# Patient Record
Sex: FEMALE | Race: BLACK OR AFRICAN AMERICAN | URBAN - METROPOLITAN AREA
[De-identification: names, ages, dates, MRNs, and addresses within clinical notes are randomized per-mention and may not be internally consistent; named-entity substitution may affect disease eponyms.]

---

## 2017-12-26 ENCOUNTER — HOSPITAL ENCOUNTER (EMERGENCY)
Age: 62
Discharge: HOME | End: 2017-12-26
Attending: EMERGENCY MEDICINE
Payer: SUBSIDIZED

## 2017-12-26 ENCOUNTER — APPOINTMENT (OUTPATIENT)
Dept: GENERAL RADIOLOGY | Age: 62
End: 2017-12-26
Attending: EMERGENCY MEDICINE
Payer: SUBSIDIZED

## 2017-12-26 VITALS
WEIGHT: 118 LBS | SYSTOLIC BLOOD PRESSURE: 153 MMHG | BODY MASS INDEX: 21.71 KG/M2 | DIASTOLIC BLOOD PRESSURE: 76 MMHG | HEIGHT: 62 IN | OXYGEN SATURATION: 99 % | HEART RATE: 75 BPM | TEMPERATURE: 97.3 F | RESPIRATION RATE: 22 BRPM

## 2017-12-26 DIAGNOSIS — J44.1 COPD EXACERBATION (HCC): Primary | ICD-10-CM

## 2017-12-26 LAB
ANION GAP SERPL CALC-SCNC: 7 MMOL/L (ref 3–18)
ATRIAL RATE: 70 BPM
BASOPHILS # BLD: 0 K/UL (ref 0–0.06)
BASOPHILS NFR BLD: 0 % (ref 0–2)
BUN SERPL-MCNC: 12 MG/DL (ref 7–18)
BUN/CREAT SERPL: 15 (ref 12–20)
CALCIUM SERPL-MCNC: 9.8 MG/DL (ref 8.5–10.1)
CALCULATED P AXIS, ECG09: 46 DEGREES
CALCULATED R AXIS, ECG10: 27 DEGREES
CALCULATED T AXIS, ECG11: 53 DEGREES
CHLORIDE SERPL-SCNC: 102 MMOL/L (ref 100–108)
CK MB CFR SERPL CALC: 1.1 % (ref 0–4)
CK MB SERPL-MCNC: 1 NG/ML (ref 5–25)
CK SERPL-CCNC: 89 U/L (ref 26–192)
CO2 SERPL-SCNC: 27 MMOL/L (ref 21–32)
CREAT SERPL-MCNC: 0.79 MG/DL (ref 0.6–1.3)
DIAGNOSIS, 93000: NORMAL
DIFFERENTIAL METHOD BLD: ABNORMAL
EOSINOPHIL # BLD: 0.1 K/UL (ref 0–0.4)
EOSINOPHIL NFR BLD: 1 % (ref 0–5)
ERYTHROCYTE [DISTWIDTH] IN BLOOD BY AUTOMATED COUNT: 13.8 % (ref 11.6–14.5)
GLUCOSE SERPL-MCNC: 101 MG/DL (ref 74–99)
HCT VFR BLD AUTO: 38.2 % (ref 35–45)
HGB BLD-MCNC: 12.5 G/DL (ref 12–16)
LYMPHOCYTES # BLD: 1.4 K/UL (ref 0.9–3.6)
LYMPHOCYTES NFR BLD: 20 % (ref 21–52)
MCH RBC QN AUTO: 26.7 PG (ref 24–34)
MCHC RBC AUTO-ENTMCNC: 32.7 G/DL (ref 31–37)
MCV RBC AUTO: 81.6 FL (ref 74–97)
MONOCYTES # BLD: 0.3 K/UL (ref 0.05–1.2)
MONOCYTES NFR BLD: 5 % (ref 3–10)
NEUTS SEG # BLD: 5.1 K/UL (ref 1.8–8)
NEUTS SEG NFR BLD: 74 % (ref 40–73)
P-R INTERVAL, ECG05: 136 MS
PLATELET # BLD AUTO: 211 K/UL (ref 135–420)
PMV BLD AUTO: 10.6 FL (ref 9.2–11.8)
POTASSIUM SERPL-SCNC: 3.5 MMOL/L (ref 3.5–5.5)
Q-T INTERVAL, ECG07: 400 MS
QRS DURATION, ECG06: 98 MS
QTC CALCULATION (BEZET), ECG08: 432 MS
RBC # BLD AUTO: 4.68 M/UL (ref 4.2–5.3)
SODIUM SERPL-SCNC: 136 MMOL/L (ref 136–145)
TROPONIN I SERPL-MCNC: <0.02 NG/ML (ref 0–0.04)
VENTRICULAR RATE, ECG03: 70 BPM
WBC # BLD AUTO: 6.9 K/UL (ref 4.6–13.2)

## 2017-12-26 PROCEDURE — 74011000250 HC RX REV CODE- 250: Performed by: EMERGENCY MEDICINE

## 2017-12-26 PROCEDURE — 80048 BASIC METABOLIC PNL TOTAL CA: CPT | Performed by: EMERGENCY MEDICINE

## 2017-12-26 PROCEDURE — 82550 ASSAY OF CK (CPK): CPT | Performed by: EMERGENCY MEDICINE

## 2017-12-26 PROCEDURE — 71010 XR CHEST PORT: CPT

## 2017-12-26 PROCEDURE — 85025 COMPLETE CBC W/AUTO DIFF WBC: CPT | Performed by: EMERGENCY MEDICINE

## 2017-12-26 PROCEDURE — 99282 EMERGENCY DEPT VISIT SF MDM: CPT

## 2017-12-26 PROCEDURE — 96374 THER/PROPH/DIAG INJ IV PUSH: CPT

## 2017-12-26 PROCEDURE — 93005 ELECTROCARDIOGRAM TRACING: CPT

## 2017-12-26 PROCEDURE — 77030029684 HC NEB SM VOL KT MONA -A

## 2017-12-26 PROCEDURE — 94640 AIRWAY INHALATION TREATMENT: CPT

## 2017-12-26 PROCEDURE — 74011250636 HC RX REV CODE- 250/636: Performed by: EMERGENCY MEDICINE

## 2017-12-26 RX ORDER — PREDNISONE 10 MG/1
30 TABLET ORAL
Qty: 9 TAB | Refills: 0 | Status: SHIPPED | OUTPATIENT
Start: 2017-12-26 | End: 2017-12-29

## 2017-12-26 RX ORDER — ALBUTEROL SULFATE 0.83 MG/ML
2.5 SOLUTION RESPIRATORY (INHALATION)
Status: COMPLETED | OUTPATIENT
Start: 2017-12-26 | End: 2017-12-26

## 2017-12-26 RX ADMIN — METHYLPREDNISOLONE SODIUM SUCCINATE 125 MG: 125 INJECTION, POWDER, FOR SOLUTION INTRAMUSCULAR; INTRAVENOUS at 12:48

## 2017-12-26 RX ADMIN — ALBUTEROL SULFATE 2.5 MG: 2.5 SOLUTION RESPIRATORY (INHALATION) at 12:48

## 2017-12-26 NOTE — DISCHARGE INSTRUCTIONS
COPD Exacerbation Plan: Care Instructions  Your Care Instructions    If you have chronic obstructive pulmonary disease (COPD), your usual shortness of breath could suddenly get worse. You may start coughing more and have more mucus. This flare-up is called a COPD exacerbation (say \"vt-EOU-yr-BAY-kevin\"). A lung infection or air pollution could set off an exacerbation. Sometimes it can happen after a quick change in temperature or being around chemicals. Work with your doctor to make a plan for dealing with an exacerbation. You can better manage it if you plan ahead. Follow-up care is a key part of your treatment and safety. Be sure to make and go to all appointments, and call your doctor if you are having problems. It's also a good idea to know your test results and keep a list of the medicines you take. How can you care for yourself at home? During an exacerbation  · Do not panic if you start to have one. Quick treatment at home may help you prevent serious breathing problems. If you have a COPD exacerbation plan that you developed with your doctor, follow it. · Take your medicines exactly as your doctor tells you. ¨ Use your inhaler as directed by your doctor. If your symptoms do not get better after you use your medicine, have someone take you to the emergency room. Call an ambulance if necessary. ¨ With inhaled medicines, a spacer or a nebulizer may help you get more medicine to your lungs. Ask your doctor or pharmacist how to use them properly. Practice using the spacer in front of a mirror before you have an exacerbation. This may help you get the medicine into your lungs quickly. ¨ If your doctor has given you steroid pills, take them as directed. ¨ Your doctor may have given you a prescription for antibiotics, which you can fill if you need it. ¨ Talk to your doctor if you have any problems with your medicine.  And call your doctor if you have to use your antibiotic or steroid pills.  Preventing an exacerbation  · Do not smoke. This is the most important step you can take to prevent more damage to your lungs and prevent problems. If you already smoke, it is never too late to stop. If you need help quitting, talk to your doctor about stop-smoking programs and medicines. These can increase your chances of quitting for good. · Take your daily medicines as prescribed. · Avoid colds and flu. ¨ Get a pneumococcal vaccine. ¨ Get a flu vaccine each year, as soon as it is available. Ask those you live or work with to do the same, so they will not get the flu and infect you. ¨ Try to stay away from people with colds or the flu. ¨ Wash your hands often. · Avoid secondhand smoke; air pollution; cold, dry air; hot, humid air; and high altitudes. Stay at home with your windows closed when air pollution is bad. · Learn breathing techniques for COPD, such as breathing through pursed lips. These techniques can help you breathe easier during an exacerbation. When should you call for help? Call 911 anytime you think you may need emergency care. For example, call if:  ? · You have severe trouble breathing. ? · You have severe chest pain. ?Call your doctor now or seek immediate medical care if:  ? · You have new or worse shortness of breath. ? · You develop new chest pain. ? · You are coughing more deeply or more often, especially if you notice more mucus or a change in the color of your mucus. ? · You cough up blood. ? · You have new or increased swelling in your legs or belly. ? · You have a fever. ? Watch closely for changes in your health, and be sure to contact your doctor if:  ? · You need to use your antibiotic or steroid pills. ? · Your symptoms are getting worse. Where can you learn more? Go to http://yessi-doreen.info/. Enter M931 in the search box to learn more about \"COPD Exacerbation Plan: Care Instructions. \"  Current as of:  May 12, 2017  Content Version: 11.4  © 7589-5583 [employer]. Care instructions adapted under license by [employer] (which disclaims liability or warranty for this information). If you have questions about a medical condition or this instruction, always ask your healthcare professional. [address] any warranty or liability for your use of this information.

## 2017-12-26 NOTE — ED TRIAGE NOTES
Pt out of town, reports no relief from her inhalers, states she feels SOB and needs to have steroids. Pt with hx of COPD, aortic aneurysm, HTN, DM.

## 2017-12-26 NOTE — ED PROVIDER NOTES
EMERGENCY DEPARTMENT HISTORY AND PHYSICAL EXAM    12:08 PM      Date: 12/26/2017  Patient Name: Jacklyn Fregoso    History of Presenting Illness     Chief Complaint   Patient presents with    Shortness of Breath         History Provided By: Patient    Chief Complaint: SOB  Duration:  4 AM today  Timing:  Constant  Location: Generalized  Quality: N/A  Severity: 0/10  Modifying Factors: Nebulizer and inhaler, without relief  Associated Symptoms: Intermittent CP at 4 am, no CP currently. No other symptoms or concerns were expressed. Additional History (Context): Jacklyn Fregoso is a 60 y.o. female with PMHx of HTN, COPD, DM and aortic aneurysm who presents to the ED with c/o SOB since 4 am this morning. Associated symptoms include intermittent CP at onset of sx, no CP currently. Patient states she used nebulizer treatment and inhaler, without relief. Reports similar sx in past for which she had received a steroid shot, \"it opens me up. \" Denies cough, rhinorrhea, nausea, sore throat, abdominal pain or recent ill contacts. Reports she is visiting from [facility] and may have become ill due to a change in weather. No other symptoms or concerns were expressed. PCP: Valentín Darnell MD      Past History     Past Medical History:  No past medical history on file. Past Surgical History:  No past surgical history on file. Family History:  No family history on file. Social History:  Social History   Substance Use Topics    Smoking status: Not on file    Smokeless tobacco: Not on file    Alcohol use Not on file       Allergies:  No Known Allergies      Review of Systems       Review of Systems   Constitutional: Negative for fever. HENT: Negative for sore throat. Eyes: Negative for redness. Respiratory: Positive for shortness of breath. Negative for wheezing. Gastrointestinal: Negative for abdominal pain and nausea. Genitourinary: Negative for dysuria. Musculoskeletal: Negative for neck stiffness.    Skin: Negative for pallor. Neurological: Negative for headaches. Hematological: Does not bruise/bleed easily. All other systems reviewed and are negative. Physical Exam     Visit Vitals    /76 (BP 1 Location: Left arm, BP Patient Position: Sitting)    Pulse 75    Temp 97.3 °F (36.3 °C)    Resp 22    Ht 5' 2\" (1.575 m)    Wt 53.5 kg (118 lb)    SpO2 99%    BMI 21.58 kg/m2         Physical Exam   Constitutional: She is oriented to person, place, and time. She appears well-developed and well-nourished. No distress. Speaking full sentences   HENT:   Head: Normocephalic and atraumatic. Mouth/Throat: Oropharynx is clear and moist.   Eyes: Conjunctivae are normal. Pupils are equal, round, and reactive to light. No scleral icterus. Neck: Normal range of motion. Neck supple. Cardiovascular: Normal rate and intact distal pulses. Capillary refill < 3 seconds   Pulmonary/Chest: Effort normal. No stridor. No respiratory distress. She has wheezes (some scattered, expiratory). Abdominal: Soft. Bowel sounds are normal. She exhibits no distension. There is no tenderness. Musculoskeletal: Normal range of motion. She exhibits no edema. Lymphadenopathy:     She has no cervical adenopathy. Neurological: She is alert and oriented to person, place, and time. No cranial nerve deficit. She exhibits normal muscle tone. Coordination normal.   Skin: Skin is warm and dry. She is not diaphoretic. Psychiatric: Her behavior is normal.   Nursing note and vitals reviewed.         Diagnostic Study Results     Labs -  Recent Results (from the past 12 hour(s))   EKG, 12 LEAD, INITIAL    Collection Time: 12/26/17 11:06 AM   Result Value Ref Range    Ventricular Rate 70 BPM    Atrial Rate 70 BPM    P-R Interval 136 ms    QRS Duration 98 ms    Q-T Interval 400 ms    QTC Calculation (Bezet) 432 ms    Calculated P Axis 46 degrees    Calculated R Axis 27 degrees    Calculated T Axis 53 degrees    Diagnosis Normal sinus rhythm  Normal ECG  No previous ECGs available     CBC WITH AUTOMATED DIFF    Collection Time: 12/26/17 12:25 PM   Result Value Ref Range    WBC 6.9 4.6 - 13.2 K/uL    RBC 4.68 4.20 - 5.30 M/uL    HGB 12.5 12.0 - 16.0 g/dL    HCT 38.2 35.0 - 45.0 %    MCV 81.6 74.0 - 97.0 FL    MCH 26.7 24.0 - 34.0 PG    MCHC 32.7 31.0 - 37.0 g/dL    RDW 13.8 11.6 - 14.5 %    PLATELET 609 951 - 126 K/uL    MPV 10.6 9.2 - 11.8 FL    NEUTROPHILS 74 (H) 40 - 73 %    LYMPHOCYTES 20 (L) 21 - 52 %    MONOCYTES 5 3 - 10 %    EOSINOPHILS 1 0 - 5 %    BASOPHILS 0 0 - 2 %    ABS. NEUTROPHILS 5.1 1.8 - 8.0 K/UL    ABS. LYMPHOCYTES 1.4 0.9 - 3.6 K/UL    ABS. MONOCYTES 0.3 0.05 - 1.2 K/UL    ABS. EOSINOPHILS 0.1 0.0 - 0.4 K/UL    ABS. BASOPHILS 0.0 0.0 - 0.06 K/UL    DF AUTOMATED     METABOLIC PANEL, BASIC    Collection Time: 12/26/17 12:25 PM   Result Value Ref Range    Sodium 136 136 - 145 mmol/L    Potassium 3.5 3.5 - 5.5 mmol/L    Chloride 102 100 - 108 mmol/L    CO2 27 21 - 32 mmol/L    Anion gap 7 3.0 - 18 mmol/L    Glucose 101 (H) 74 - 99 mg/dL    BUN 12 7.0 - 18 MG/DL    Creatinine 0.79 0.6 - 1.3 MG/DL    BUN/Creatinine ratio 15 12 - 20      GFR est AA >60 >60 ml/min/1.73m2    GFR est non-AA >60 >60 ml/min/1.73m2    Calcium 9.8 8.5 - 10.1 MG/DL   CARDIAC PANEL,(CK, CKMB & TROPONIN)    Collection Time: 12/26/17 12:25 PM   Result Value Ref Range    CK 89 26 - 192 U/L    CK - MB 1.0 <3.6 ng/ml    CK-MB Index 1.1 0.0 - 4.0 %    Troponin-I, Qt. <0.02 0.0 - 0.045 NG/ML       Radiologic Studies -   XR CHEST PORT   Final Result      CXR:  No acute cardiopulmonary process. Medical Decision Making   I am the first provider for this patient. I reviewed the vital signs, available nursing notes, past medical history, past surgical history, family history and social history. Vital Signs-Reviewed the patient's vital signs.     Pulse Oximetry Analysis -  99% on room air, stable    Cardiac Monitor:  Rate: 70  Rhythm:  Normal Sinus Rhythm     EKG: Interpreted by the EP. Time Interpreted: 11:06 AM   Rate: 70   Rhythm: Normal Sinus Rhythm    Interpretation: Normal QRS. Normal QTC. Normal axis. No ST elevation or depression. Records Reviewed: Nursing Notes, Old Medical Records, Previous Radiology Studies and Previous Laboratory Studies (Time of Review: 12:08 PM)    Provider Notes (Medical Decision Making): ddx copd exacerbation, PNA, URI  MDM  Number of Diagnoses or Management Options  COPD exacerbation ([facility]):       Medications   albuterol (PROVENTIL VENTOLIN) nebulizer solution 2.5 mg (2.5 mg Nebulization Given 12/26/17 1248)   methylPREDNISolone (PF) (SOLU-MEDROL) injection 125 mg (125 mg IntraVENous Given 12/26/17 1248)     ED Course: Progress Notes, Reevaluation, and Consults:  After neb and steroid, pt feeling better, and states she is ready to go. I have reassessed the patient. I have discussed the workup, results and plan with the patient and patient is in agreement. Patient is feeling better. Patient will be prescribed prednisone. Patient was discharge in stable condition. Patient was given outpatient follow up. Patient is to return to emergency department if any new or worsening condition. Diagnosis     Clinical Impression:   1. [facility] exacerbation ([facility]        Disposition: Discharge. Follow-up Information     Follow up With Details Comments Contact Info    Valentín Darnell MD In 2 days  Patient can only remember the practice name and not the physician      [facility] EMERGENCY DEPT  As needed, If symptoms worsen [address] [facility] [address]  [phone]           Patient's Medications   Start Taking    PREDNISONE (DELTASONE) 10 MG TABLET    Take 3 Tabs by mouth daily (with breakfast) for 3 days.    Continue Taking    No medications on file   These Medications have changed    No medications on file   Stop Taking    No medications on file     _______________________________    Attestations:  Jennifer Attestation     Kathleen acting as a scribe for and in the presence of Debora Bonilla DO      December 26, 2017 at 12:08 PM       Provider Attestation:      I personally performed the services described in the documentation, reviewed the documentation, as recorded by the scribe in my presence, and it accurately and completely records my words and actions.  December 26, 2017 at 12:08 PM - Debora Bonilla DO    _______________________________

## 2020-10-23 ENCOUNTER — OFFICE VISIT (OUTPATIENT)
Dept: CARDIOLOGY CLINIC | Age: 65
End: 2020-10-23
Payer: MEDICARE

## 2020-10-23 VITALS
SYSTOLIC BLOOD PRESSURE: 179 MMHG | TEMPERATURE: 97.7 F | HEIGHT: 62 IN | WEIGHT: 119 LBS | DIASTOLIC BLOOD PRESSURE: 72 MMHG | HEART RATE: 56 BPM | BODY MASS INDEX: 21.9 KG/M2

## 2020-10-23 DIAGNOSIS — J44.9 CHRONIC OBSTRUCTIVE PULMONARY DISEASE, UNSPECIFIED COPD TYPE (HCC): ICD-10-CM

## 2020-10-23 DIAGNOSIS — R94.31 ABNORMAL EKG: ICD-10-CM

## 2020-10-23 DIAGNOSIS — Z98.890 H/O REPAIR OF DISSECTING THORACIC ANEURYSM: ICD-10-CM

## 2020-10-23 DIAGNOSIS — I10 ESSENTIAL HYPERTENSION: ICD-10-CM

## 2020-10-23 DIAGNOSIS — R06.02 SHORTNESS OF BREATH: Primary | ICD-10-CM

## 2020-10-23 DIAGNOSIS — R06.02 SHORTNESS OF BREATH: ICD-10-CM

## 2020-10-23 DIAGNOSIS — E78.5 HYPERLIPIDEMIA, UNSPECIFIED HYPERLIPIDEMIA TYPE: ICD-10-CM

## 2020-10-23 DIAGNOSIS — Z86.79 H/O REPAIR OF DISSECTING THORACIC ANEURYSM: ICD-10-CM

## 2020-10-23 PROCEDURE — 99204 OFFICE O/P NEW MOD 45 MIN: CPT | Performed by: INTERNAL MEDICINE

## 2020-10-23 PROCEDURE — 93000 ELECTROCARDIOGRAM COMPLETE: CPT | Performed by: INTERNAL MEDICINE

## 2020-10-23 RX ORDER — ASPIRIN 81 MG/1
81 TABLET ORAL DAILY
COMMUNITY

## 2020-10-23 RX ORDER — CHOLECALCIFEROL (VITAMIN D3) 50 MCG
CAPSULE ORAL
COMMUNITY

## 2020-10-23 RX ORDER — PRAVASTATIN SODIUM 40 MG/1
40 TABLET ORAL DAILY
COMMUNITY

## 2020-10-23 RX ORDER — IBUPROFEN 200 MG
CAPSULE ORAL DAILY
COMMUNITY

## 2020-10-23 RX ORDER — NEBIVOLOL HYDROCHLORIDE 10 MG/1
TABLET ORAL
COMMUNITY
Start: 2020-09-24

## 2020-10-23 RX ORDER — TRAZODONE HYDROCHLORIDE 50 MG/1
TABLET ORAL
COMMUNITY
Start: 2020-10-05

## 2020-10-23 RX ORDER — AMLODIPINE BESYLATE AND BENAZEPRIL HYDROCHLORIDE 2.5; 1 MG/1; MG/1
CAPSULE ORAL
COMMUNITY
Start: 2020-09-03

## 2020-10-23 RX ORDER — FLUTICASONE FUROATE, UMECLIDINIUM BROMIDE AND VILANTEROL TRIFENATATE 100; 62.5; 25 UG/1; UG/1; UG/1
POWDER RESPIRATORY (INHALATION)
COMMUNITY
Start: 2020-09-04

## 2020-10-23 NOTE — PROGRESS NOTES
HISTORY OF PRESENT ILLNESS  Malvin Kinney is a 76 y.o. female. 10/23/2020  Patient seen today for new patient evaluation. She is referred here for shortness of breath. Patient shortness of breath that is progressively getting worse as short of breath on minimal activity exertion she does have underlying COPD. She had rupture of aortic aneurysm 2015. She was in [place] at that time. She was comatose for almost a month at that time with a long recovery. She had endovascular repair of aneurysm at that time since then she has done well with the aneurysm. She had her last CAT scans just before moving in this area which was reportedly stable. New Patient   The history is provided by the patient. Associated symptoms include shortness of breath. Pertinent negatives include no chest pain, no abdominal pain and no headaches. Shortness of Breath   The history is provided by the patient. This is a new problem. The problem occurs frequently. The current episode started more than 1 week ago. The problem has been gradually worsening. Pertinent negatives include no fever, no headaches, no cough, no sputum production, no hemoptysis, no wheezing, no PND, no orthopnea, no chest pain, no vomiting, no abdominal pain, no rash, no leg swelling and no claudication. Precipitated by: Activity, exertion. Review of Systems   Constitutional: Negative for chills and fever. HENT: Negative for nosebleeds. Eyes: Negative for blurred vision and double vision. Respiratory: Positive for shortness of breath. Negative for cough, hemoptysis, sputum production and wheezing. Cardiovascular: Negative for chest pain, palpitations, orthopnea, claudication, leg swelling and PND. Gastrointestinal: Negative for abdominal pain, heartburn, nausea and vomiting. Musculoskeletal: Negative for myalgias. Skin: Negative for rash. Neurological: Negative for dizziness, weakness and headaches.    Endo/Heme/Allergies: Does not bruise/bleed easily. Family History   Problem Relation Age of Onset    No Known Problems Mother     No Known Problems Father        Past Medical History:   Diagnosis Date    Chronic obstructive pulmonary disease ([address]     Essential hypertension     Hyperlipidemia        Past Surgical History:   Procedure Laterality Date    HX OTHER SURGICAL      EVAR for thoracic aneurysm       Social History     Tobacco Use    Smoking status: Former Smoker     Last attempt to quit: 10/23/2015     Years since quittin.0    Smokeless tobacco: Never Used   Substance Use Topics    Alcohol use: Never     Frequency: Never       No Known Allergies    Prior to Admission medications    Medication Sig Start Date End Date Taking? Authorizing Provider   amLODIPine-benazepril (LOTREL) 2.5-10 mg per capsule 2 tab daily 9/3/20  Yes Provider, Historical   aspirin delayed-release 81 mg tablet Take 81 mg by mouth daily. Yes Provider, Historical   Bystolic 10 mg tablet    Yes Provider, Historical   pravastatin (PRAVACHOL) 40 mg tablet Take 40 mg by mouth daily. Yes Provider, Historical   traZODone (DESYREL) 50 mg tablet take 1 tablet by mouth at bedtime 10/5/20  Yes Provider, Historical   Trelegy Ellipta 100-62.5-25 mcg inhaler  20  Yes Provider, Historical   omega 3-dha-epa-fish oil (Fish Oil) 100-160-1,000 mg cap Take  by mouth. Yes Provider, Historical   calcium citrate 200 mg (950 mg) tablet Take  by mouth daily. Yes Provider, Historical         Visit Vitals  BP (!) 179/72   Pulse (!) 56   Temp 97.7 °F (36.5 °C) (Temporal)   Ht 5' 2\" (1.575 m)   Wt 54 kg (119 lb)   BMI 21.77 kg/m²         Physical Exam   Constitutional: She is oriented to person, place, and time. She appears well-developed and well-nourished. HENT:   Head: Normocephalic and atraumatic. Eyes: Conjunctivae are normal.   Neck: Neck supple. No JVD present. No tracheal deviation present. No thyromegaly present.    Cardiovascular: Normal rate and regular rhythm. PMI is not displaced. Exam reveals no gallop, no S3 and no decreased pulses. No murmur heard. Pulmonary/Chest: No respiratory distress. She has no wheezes. She has no rales. She exhibits no tenderness. Abdominal: Soft. There is no abdominal tenderness. Musculoskeletal:         General: No edema. Neurological: She is alert and oriented to person, place, and time. Skin: Skin is warm. Psychiatric: She has a normal mood and affect. Ms. Vivi Harley has a reminder for a \"due or due soon\" health maintenance. I have asked that she contact her primary care provider for follow-up on this health maintenance. No flowsheet data found. Assessment         ICD-10-CM ICD-9-CM    1. Shortness of breath  R06.02 786.05 AMB POC EKG ROUTINE W/ 12 LEADS, INTER & REP      ECHO ADULT COMPLETE      NUCLEAR CARDIAC STRESS TEST    Multifactorial.  Rule out valvular disease or ischemic heart disease. History of previous smoking and COPD being followed by pulmonary   2. Essential hypertension  I10 401.9 AMB POC EKG ROUTINE W/ 12 LEADS, INTER & REP    Elevated blood pressure in office. Home readings are normal.  Continue monitoring   3. Hyperlipidemia, unspecified hyperlipidemia type  E78.5 272.4 AMB POC EKG ROUTINE W/ 12 LEADS, INTER & REP    Continue statin follow-up lab with PCP   4. Chronic obstructive pulmonary disease, unspecified COPD type ([facility]  J44.9 496 AMB POC EKG ROUTINE W/ 12 LEADS, INTER & REP      ECHO ADULT COMPLETE      NUCLEAR CARDIAC STRESS TEST    Continue treatment and pulmonary and PCP instructions. 5. H/O repair of dissecting thoracic aneurysm  Z98.890 V15.1     Z86.79      Patient had EVAR done for this in [facility]. CAT scan before moving here was reportedly negative   6. Abnormal EKG  R94.31 794.31     Possible old MI. Set up for stress test       There are no discontinued medications.     Orders Placed This Encounter    AMB POC EKG ROUTINE W/ 12 LEADS, INTER & REP     Order Specific Question:   Reason for Exam:     Answer:   cad    ECHO ADULT COMPLETE     Standing Status:   Future     Standing Expiration Date:   10/23/2021     Order Specific Question:   Contrast Enhancement (Bubble Study, Definity, Optison) may be used if criteria listed in established evidence-based protocol has been identified. Answer:   Yes       Follow-up and Dispositions    · Return for F/u after tests.

## 2020-11-05 LAB
AV VELOCITY RATIO: 0.94
ECHO AO ASC DIAM: 3.08 CM
ECHO AO ROOT DIAM: 2.83 CM
ECHO AO ST JNCT DIAM: 3.08 CM
ECHO AV PEAK GRADIENT: 6 MMHG
ECHO AV PEAK VELOCITY: 122.82 CM/S
ECHO LA AREA 2C: 15.27 CM2
ECHO LA AREA 4C: 13.2 CM2
ECHO LA MAJOR AXIS: 2.65 CM
ECHO LA MINOR AXIS: 1.73 CM
ECHO LA TO AORTIC ROOT RATIO: 0.94
ECHO LA VOL 2C: 32.33 ML (ref 22–52)
ECHO LA VOL 4C: 28.93 ML (ref 22–52)
ECHO LA VOL BP: 32.16 ML (ref 22–52)
ECHO LA VOL/BSA BIPLANE: 20.98 ML/M2 (ref 16–28)
ECHO LA VOLUME INDEX A2C: 21.09 ML/M2 (ref 16–28)
ECHO LA VOLUME INDEX A4C: 18.87 ML/M2 (ref 16–28)
ECHO LV E' LATERAL VELOCITY: 10.34 CM/S
ECHO LV E' SEPTAL VELOCITY: 10.38 CM/S
ECHO LV EDV TEICHHOLZ: 0.43 ML
ECHO LV ESV TEICHHOLZ: 0.19 ML
ECHO LV INTERNAL DIMENSION DIASTOLIC: 3.95 CM (ref 3.9–5.3)
ECHO LV INTERNAL DIMENSION SYSTOLIC: 2.81 CM
ECHO LV IVSD: 0.96 CM (ref 0.6–0.9)
ECHO LV MASS 2D: 128.1 G (ref 67–162)
ECHO LV MASS INDEX 2D: 83.6 G/M2 (ref 43–95)
ECHO LV POSTERIOR WALL DIASTOLIC: 1.08 CM (ref 0.6–0.9)
ECHO LVOT PEAK GRADIENT: 5.3 MMHG
ECHO LVOT PEAK VELOCITY: 114.92 CM/S
ECHO MV "A" WAVE DURATION: 129.39 MS
ECHO MV A VELOCITY: 92.95 CM/S
ECHO MV AREA PHT: 4 CM2
ECHO MV E DECELERATION TIME (DT): 188.2 MS
ECHO MV E VELOCITY: 79.85 CM/S
ECHO MV E/A RATIO: 0.86
ECHO MV E/E' LATERAL: 7.72
ECHO MV E/E' RATIO (AVERAGED): 7.71
ECHO MV E/E' SEPTAL: 7.69
ECHO MV PRESSURE HALF TIME (PHT): 54.6 MS
ECHO PV MAX VELOCITY: 85.75 CM/S
ECHO PV PEAK GRADIENT: 2.9 MMHG
ECHO PV PEAK INSTANTANEOUS GRADIENT SYSTOLIC: 2.98 MMHG
ECHO RA AREA 4C: 11.4 CM2
ECHO RV INTERNAL DIMENSION: 2.87 CM
ECHO RV TAPSE: 1.87 CM (ref 1.5–2)
ECHO TV REGURGITANT MAX VELOCITY: 235.98 CM/S
ECHO TV REGURGITANT PEAK GRADIENT: 22.3 MMHG
LVFS 2D: 28.99 %
LVSV (TEICH): 24.97 ML
MV DEC SLOPE: 4.24

## 2020-11-06 LAB
NUC STRESS DIASTOLIC VOLUME INDEX: 25 ML/M2
NUC STRESS SYSTOLIC VOLUME INDEX: 3 ML/M2
STRESS BASELINE DIAS BP: 68 MMHG
STRESS BASELINE HR: 78 BPM
STRESS BASELINE SYS BP: 142 MMHG
STRESS PEAK DIAS BP: 90 MMHG
STRESS PEAK SYS BP: 180 MMHG
STRESS PERCENT HR ACHIEVED: 82 %
STRESS POST PEAK HR: 126 BPM
STRESS RATE PRESSURE PRODUCT: NORMAL BPM*MMHG
STRESS ST DEPRESSION: 0 MM
STRESS ST ELEVATION: 0 MM
STRESS STAGE 1 BP: NORMAL MMHG
STRESS STAGE 1 HR: 113 BPM
STRESS STAGE 2 BP: NORMAL MMHG
STRESS STAGE 2 HR: 126 BPM
STRESS STAGE RECOVERY 1 BP: NORMAL MMHG
STRESS STAGE RECOVERY 1 HR: 112 BPM
STRESS STAGE RECOVERY 2 BP: NORMAL MMHG
STRESS STAGE RECOVERY 2 HR: 107 BPM
STRESS STAGE RECOVERY 3 BP: NORMAL MMHG
STRESS STAGE RECOVERY 3 HR: 100 BPM
STRESS TARGET HR: 154 BPM

## 2020-11-17 ENCOUNTER — OFFICE VISIT (OUTPATIENT)
Dept: PULMONOLOGY | Age: 65
End: 2020-11-17
Payer: MEDICARE

## 2020-11-17 VITALS
RESPIRATION RATE: 18 BRPM | BODY MASS INDEX: 22.45 KG/M2 | DIASTOLIC BLOOD PRESSURE: 67 MMHG | HEIGHT: 62 IN | TEMPERATURE: 97.4 F | HEART RATE: 82 BPM | OXYGEN SATURATION: 96 % | WEIGHT: 122 LBS | SYSTOLIC BLOOD PRESSURE: 155 MMHG

## 2020-11-17 DIAGNOSIS — J44.9 CHRONIC OBSTRUCTIVE PULMONARY DISEASE, UNSPECIFIED COPD TYPE (HCC): Primary | ICD-10-CM

## 2020-11-17 DIAGNOSIS — I10 BENIGN ESSENTIAL HTN: ICD-10-CM

## 2020-11-17 DIAGNOSIS — Z87.891 HISTORY OF TOBACCO USE: ICD-10-CM

## 2020-11-17 DIAGNOSIS — Z86.79 HISTORY OF AORTIC ANEURYSM: ICD-10-CM

## 2020-11-17 PROCEDURE — G8536 NO DOC ELDER MAL SCRN: HCPCS | Performed by: INTERNAL MEDICINE

## 2020-11-17 PROCEDURE — G8510 SCR DEP NEG, NO PLAN REQD: HCPCS | Performed by: INTERNAL MEDICINE

## 2020-11-17 PROCEDURE — G8427 DOCREV CUR MEDS BY ELIG CLIN: HCPCS | Performed by: INTERNAL MEDICINE

## 2020-11-17 PROCEDURE — G8400 PT W/DXA NO RESULTS DOC: HCPCS | Performed by: INTERNAL MEDICINE

## 2020-11-17 PROCEDURE — 1101F PT FALLS ASSESS-DOCD LE1/YR: CPT | Performed by: INTERNAL MEDICINE

## 2020-11-17 PROCEDURE — G8420 CALC BMI NORM PARAMETERS: HCPCS | Performed by: INTERNAL MEDICINE

## 2020-11-17 PROCEDURE — 99204 OFFICE O/P NEW MOD 45 MIN: CPT | Performed by: INTERNAL MEDICINE

## 2020-11-17 PROCEDURE — 1090F PRES/ABSN URINE INCON ASSESS: CPT | Performed by: INTERNAL MEDICINE

## 2020-11-17 PROCEDURE — 3017F COLORECTAL CA SCREEN DOC REV: CPT | Performed by: INTERNAL MEDICINE

## 2020-11-17 NOTE — PROGRESS NOTES
Jean Marie Prabhakar presents today for   Chief Complaint   Patient presents with    COPD     Per Dr. Eric Lee of Breath       Is someone accompanying this pt? No    Is the patient using any DME equipment during OV? No    -DME Company NA    Depression Screening:  3 most recent PHQ Screens 11/17/2020   Little interest or pleasure in doing things Not at all   Feeling down, depressed, irritable, or hopeless Not at all   Total Score PHQ 2 0       Learning Assessment:  No flowsheet data found. Abuse Screening:  No flowsheet data found. Fall Risk  Fall Risk Assessment, last 12 mths 11/17/2020   Able to walk? Yes   Fall in past 12 months? No         Coordination of Care:  1. Have you been to the ER, urgent care clinic since your last visit? Hospitalized since your last visit? No    2. Have you seen or consulted any other health care providers outside of the [address] [place] [address] since your last visit? Include any pap smears or colon screening.  No.

## 2020-11-17 NOTE — PROGRESS NOTES
HISTORY OF PRESENT ILLNESS  Robbin Martinez is a 76 y.o. female referred to establish care for COPD. Pt who is a recent transfer from [facility] with history of COPD diagnosed 5 years ago during a prolonged hospital admission for Aortic aneurysm. Pt complains of baseline SOB walking up 2 flights of stairs or with weather changes. Other symptoms include wheezing and a cough without phlegm production. Pt is maintained on Trelegy which she has been on for about a year. She is also on prn Combivent and nebulized Albuterol. Despite this, pt has occasional exacerbations, seen in Urgent care 1 week prior to this visit and pt was Rxed steroids and Azithromycin. Denies chest pain, fever or hemoptysis. No pedal edema, orthopnea or PND. Last PFT's were over a year, unknown FEV1. Review of Systems   Constitutional: Negative for chills, diaphoresis, fever, malaise/fatigue and weight loss. HENT: Negative for congestion, ear discharge, ear pain, hearing loss, nosebleeds, sinus pain, sore throat and tinnitus. Eyes: Negative for blurred vision, double vision, photophobia, pain, discharge and redness. Respiratory: Positive for cough, shortness of breath and wheezing. Negative for hemoptysis, sputum production and stridor. Cardiovascular: Negative for chest pain, palpitations, orthopnea, claudication, leg swelling and PND. Gastrointestinal: Negative for abdominal pain, blood in stool, constipation, diarrhea, heartburn, melena, nausea and vomiting. Genitourinary: Negative for dysuria, flank pain, frequency, hematuria and urgency. Musculoskeletal: Negative for back pain, falls, joint pain, myalgias and neck pain. Skin: Negative for itching and rash. Neurological: Negative for dizziness, tingling, tremors, sensory change, speech change, focal weakness, seizures, loss of consciousness, weakness and headaches. Endo/Heme/Allergies: Negative for environmental allergies and polydipsia. Does not bruise/bleed easily. Psychiatric/Behavioral: Negative for depression, hallucinations, memory loss, substance abuse and suicidal ideas. The patient is not nervous/anxious and does not have insomnia. Past Medical History:   Diagnosis Date    Chronic obstructive pulmonary disease ([facility] [address]     Essential hypertension     Hyperlipidemia      Past Surgical History:   Procedure Laterality Date    HX OTHER SURGICAL      EVAR for thoracic aneurysm     Current Outpatient Medications on File Prior to Visit   Medication Sig Dispense Refill    amLODIPine-benazepril (LOTREL) 2.5-10 mg per capsule 2 tab daily      aspirin delayed-release 81 mg tablet Take 81 mg by mouth daily.  Bystolic 10 mg tablet       pravastatin (PRAVACHOL) 40 mg tablet Take 40 mg by mouth daily.  traZODone (DESYREL) 50 mg tablet take 1 tablet by mouth at bedtime      Trelegy Ellipta 100-62.5-25 mcg inhaler       omega 3-dha-epa-fish oil (Fish Oil) 100-160-1,000 mg cap Take  by mouth.  calcium citrate 200 mg (950 mg) tablet Take  by mouth daily. No current facility-administered medications on file prior to visit.       No Known Allergies  Family History   Problem Relation Age of Onset    No Known Problems Mother     No Known Problems Father      Social History     Socioeconomic History    Marital status: SINGLE     Spouse name: Not on file    Number of children: Not on file    Years of education: Not on file    Highest education level: Not on file   Occupational History    Not on file   Social Needs    Financial resource strain: Not on file    Food insecurity     Worry: Not on file     Inability: Not on file    Transportation needs     Medical: Not on file     Non-medical: Not on file   Tobacco Use    Smoking status: Former Smoker     Packs/day: 0.75     Years: 45.00     Pack years: 33.75     Last attempt to quit: 10/23/2015     Years since quittin.0    Smokeless tobacco: Never Used   Substance and Sexual Activity    Alcohol use: Never     Frequency: Never    Drug use: Not on file    Sexual activity: Not on file   Lifestyle    Physical activity     Days per week: Not on file     Minutes per session: Not on file    Stress: Not on file   Relationships    Social connections     Talks on phone: Not on file     Gets together: Not on file     Attends Sikhism service: Not on file     Active member of club or organization: Not on file     Attends meetings of clubs or organizations: Not on file     Relationship status: Not on file    Intimate partner violence     Fear of current or ex partner: Not on file     Emotionally abused: Not on file     Physically abused: Not on file     Forced sexual activity: Not on file   Other Topics Concern    Not on file   Social History Narrative    Pt moved from [address] [place] [address],[address] [facility] to live with her daughter in [address]. No foreign travel. No pets. Denies history of occupational exposure to noxious stimuli. Blood pressure (!) 155/67, pulse 82, temperature 97.4 °F (36.3 °C), temperature source Oral, resp. rate 18, height 5' 2\" (1.575 m), weight 55.3 kg (122 lb), SpO2 96 %. Physical Exam  Constitutional:       General: She is not in acute distress. Appearance: Normal appearance. She is not ill-appearing, toxic-appearing or diaphoretic. HENT:      Head: Normocephalic and atraumatic. Right Ear: There is no impacted cerumen. Left Ear: There is no impacted cerumen. Nose:      Comments: Deferred      Mouth/Throat:      Comments: Deferred   Eyes:      General: No scleral icterus. Right eye: No discharge. Left eye: No discharge. Extraocular Movements: Extraocular movements intact. Conjunctiva/sclera: Conjunctivae normal.      Pupils: Pupils are equal, round, and reactive to light. Neck:      Musculoskeletal: No neck rigidity or muscular tenderness. Vascular: No carotid bruit. Cardiovascular:      Rate and Rhythm: Normal rate and regular rhythm.       Pulses: Normal pulses. Heart sounds: No murmur. No gallop. Pulmonary:      Effort: Pulmonary effort is normal. No respiratory distress. Breath sounds: No stridor. No wheezing, rhonchi or rales. Comments: Distant breath sounds  Abdominal:      General: Abdomen is flat. Palpations: Abdomen is soft. Tenderness: There is no abdominal tenderness. Musculoskeletal:         General: No swelling, tenderness, deformity or signs of injury. Right lower leg: No edema. Left lower leg: No edema. Lymphadenopathy:      Cervical: No cervical adenopathy. Skin:     General: Skin is warm and dry. Coloration: Skin is not jaundiced or pale. Findings: No bruising, erythema or lesion. Neurological:      General: No focal deficit present. Mental Status: She is alert and oriented to person, place, and time. Coordination: Coordination normal.   Psychiatric:         Mood and Affect: Mood normal.         Behavior: Behavior normal.         Thought Content: Thought content normal.         Judgment: Judgment normal.       10/23/20   ECHO ADULT COMPLETE 11/05/2020 11/5/2020    Narrative · LV: Estimated LVEF is 60 - 65%. Normal cavity size, wall thickness and   systolic function (ejection fraction normal). Wall motion: normal. Mild   (grade 1) left ventricular diastolic dysfunction. · RV: Normal right ventricular size and function. · MV: Mild mitral valve regurgitation is present. · PA: Pulmonary arterial systolic pressure is 25 mmHg. Signed by: Laura Denson MD       ASSESSMENT and PLAN  Encounter Diagnoses   Name Primary?  Chronic obstructive pulmonary disease, unspecified COPD type ([facility] [address] Yes    History of tobacco use     Benign essential HTN     History of aortic aneurysm        COPD, unknown FEV1. Pt is on appropriate therapy with Trelegy and prn INDER. Will schedule baseline PFT's and tailor further therapy based on this.   No apparent need for supplemental O2 but will reassess on subsequent visits. Flu vaccine given outside. Encouraged pt on tobacco abstinence. Obtain records from [facility]. RTC after PFT's. Over 50% of this 45 minute visit was spent in face to face counseling.

## 2020-11-30 ENCOUNTER — OFFICE VISIT (OUTPATIENT)
Dept: CARDIOLOGY CLINIC | Age: 65
End: 2020-11-30

## 2021-02-15 ENCOUNTER — DOCUMENTATION ONLY (OUTPATIENT)
Dept: PULMONOLOGY | Age: 66
End: 2021-02-15

## 2021-02-15 NOTE — PROGRESS NOTES
Attempted to reach pt to [facility] fu w/full pft and ldct prior-mlbx not set up-will mail letter

## 2021-03-18 ENCOUNTER — TELEPHONE (OUTPATIENT)
Dept: PULMONOLOGY | Age: 66
End: 2021-03-18

## 2024-08-26 ENCOUNTER — CLINICAL DOCUMENTATION (OUTPATIENT)
Age: 69
End: 2024-08-26

## 2024-08-26 NOTE — PROGRESS NOTES
Received referral from [facility] physician dr mary kate nuno office for ascending aorta. Called dr Brad Galindo office @ [phone] to notify them that pt referral will be fax. Fax# [phone]